# Patient Record
(demographics unavailable — no encounter records)

---

## 2025-07-09 NOTE — HISTORY OF PRESENT ILLNESS
[Postpartum Follow Up] : postpartum follow up [Complications:___] : complications include: [unfilled] [Last Pap Date: ___] : Last Pap Date: [unfilled] [Delivery Date: ___] : on [unfilled] [Primary C/S] : delivered by  section [Male] : Delivery History: baby boy [Wt. ___] : weighing [unfilled] [Rhogam] : Rhogam was not administered [Rubella Vaccine] : Rubella vaccine was not administered [BTL] : no tubal ligation [Breastfeeding] : currently nursing [Discharge HCT: ___] : hematocrit level was [unfilled] [Discharge HGB: ___] : hemoglobin level was [unfilled] [Resumed Menses] : has not resumed her menses [Resumed [place]] : has not resumed intercourse [Intended Contraception] : Intended Contraception: [Oral Contraceptives] : oral contraceptives [S/Sx PP Depression] : no signs/symptoms of postpartum depression [None] : No associated symptoms are reported [Clean/Dry/Intact] : clean, dry and intact [Doing Well] : is doing well [FreeTextEntry8] : Here for wound check.  6/10/25 Boy 8-2 [de-identified] : Will use OCPs- breastfeeding POPs [de-identified] : Keep incision clean and dry. No heavy lifting. Breastfeeding/happy with baby. Taking Prenatal vits daily. Contraceptive counseling- wants POPs. RTC 2 weeks for full postpartum exam- pt had to leave today. MHegarty NP

## 2025-07-30 NOTE — HISTORY OF PRESENT ILLNESS
[Postpartum Follow Up] : postpartum follow up [Complications:___] : no complications [Delivery Date: ___] : on [unfilled] [Primary C/S] : delivered by  section [Male] : Delivery History: baby boy [Wt. ___] : weighing [unfilled] [Rhogam] : Rhogam was not administered [BTL] : no tubal ligation [Breastfeeding] : currently nursing [BF with Difficulty] : nursing without difficulty [Discharge HCT: ___] : hematocrit level was [unfilled] [Discharge HGB: ___] : hemoglobin level was [unfilled] [Resumed Menses] : has resumed her menses [Resumed [place]] : has resumed intercourse [Intended Contraception] : Intended Contraception: [Condoms] : condoms [Currently Experiencing] : The patient is currently experiencing symptoms. [Abdominal Pain] : abdominal pain [S/Sx PP Depression] : signs/symptoms of postpartum depression [Incisional Pain] : incisional pain [Irregular Bleeding] : irregular bleeding [None] : No associated symptoms are reported [Clean/Dry/Intact] : clean, dry and intact [Erythema] : not erythematous [Swelling] : not swollen [Dehiscence] : not dehisced [Healed] : healed [Back to Normal] : is back to normal in size [Moderate] : moderate vaginal bleeding [Normal] : the vagina was normal [Cervix Sample Taken] : cervical sample not taken for a Pap smear [Examination Of The Breasts] : breasts are normal [No Sign of Infection] : is showing no signs of infection [Fair Pain Control] : has fair pain control [Slow Progress] : is progressing slowly [FreeTextEntry8] : Pain, bleeding and PP depression [de-identified] : Postpartum 7 weeks, abdominal/incisional pain, irregular bleeding, PP depression, bonding appropriately with baby--denies feelings of harming herself or new baby. [de-identified] : Explained bleeding most likely irregular as patient is breast and bottle feeding, continue NSAID's for pain, social work consult today to assist patient with referrals for psychotherapy due to symptoms of PP depression, diet and exercise (walking only encouraged), advised to call clinic or come to ER for any increasing signs or feelings of depression, birth control options reviewed and desires condom use only and condoms provided, follow up 6 months for annual Gyn exam.